# Patient Record
Sex: MALE | Race: WHITE | Employment: FULL TIME | URBAN - METROPOLITAN AREA
[De-identification: names, ages, dates, MRNs, and addresses within clinical notes are randomized per-mention and may not be internally consistent; named-entity substitution may affect disease eponyms.]

---

## 2018-08-11 ENCOUNTER — APPOINTMENT (OUTPATIENT)
Dept: CT IMAGING | Age: 23
End: 2018-08-11

## 2018-08-11 ENCOUNTER — HOSPITAL ENCOUNTER (EMERGENCY)
Age: 23
Discharge: HOME | End: 2018-08-11
Attending: EMERGENCY MEDICINE

## 2018-08-11 VITALS
DIASTOLIC BLOOD PRESSURE: 85 MMHG | HEIGHT: 75 IN | TEMPERATURE: 99.1 F | SYSTOLIC BLOOD PRESSURE: 122 MMHG | BODY MASS INDEX: 18.65 KG/M2 | OXYGEN SATURATION: 100 % | RESPIRATION RATE: 18 BRPM | HEART RATE: 78 BPM | WEIGHT: 150 LBS

## 2018-08-11 DIAGNOSIS — R10.9 RIGHT FLANK PAIN: Primary | ICD-10-CM

## 2018-08-11 LAB
A/G RATIO: 1.5 (ref 1.1–2.2)
ALBUMIN SERPL-MCNC: 4.9 G/DL (ref 3.4–5)
ALP BLD-CCNC: 81 U/L (ref 40–129)
ALT SERPL-CCNC: 13 U/L (ref 10–40)
AMPHETAMINE SCREEN, URINE: NORMAL
ANION GAP SERPL CALCULATED.3IONS-SCNC: 11 MMOL/L (ref 3–16)
AST SERPL-CCNC: 18 U/L (ref 15–37)
BARBITURATE SCREEN URINE: NORMAL
BASOPHILS ABSOLUTE: 0 K/UL (ref 0–0.2)
BASOPHILS RELATIVE PERCENT: 0.4 %
BENZODIAZEPINE SCREEN, URINE: NORMAL
BILIRUB SERPL-MCNC: 0.6 MG/DL (ref 0–1)
BILIRUBIN URINE: NEGATIVE
BLOOD, URINE: NEGATIVE
BUN BLDV-MCNC: 12 MG/DL (ref 7–20)
CALCIUM SERPL-MCNC: 9.9 MG/DL (ref 8.3–10.6)
CANNABINOID SCREEN URINE: NORMAL
CHLORIDE BLD-SCNC: 100 MMOL/L (ref 99–110)
CLARITY: CLEAR
CO2: 27 MMOL/L (ref 21–32)
COCAINE METABOLITE SCREEN URINE: NORMAL
COLOR: YELLOW
CREAT SERPL-MCNC: 0.8 MG/DL (ref 0.9–1.3)
EOSINOPHILS ABSOLUTE: 0.1 K/UL (ref 0–0.6)
EOSINOPHILS RELATIVE PERCENT: 0.9 %
GFR AFRICAN AMERICAN: >60
GFR NON-AFRICAN AMERICAN: >60
GLOBULIN: 3.2 G/DL
GLUCOSE BLD-MCNC: 102 MG/DL (ref 70–99)
GLUCOSE URINE: NEGATIVE MG/DL
HCT VFR BLD CALC: 43.4 % (ref 40.5–52.5)
HEMOGLOBIN: 15.3 G/DL (ref 13.5–17.5)
KETONES, URINE: NEGATIVE MG/DL
LEUKOCYTE ESTERASE, URINE: NEGATIVE
LYMPHOCYTES ABSOLUTE: 2.4 K/UL (ref 1–5.1)
LYMPHOCYTES RELATIVE PERCENT: 41.6 %
Lab: NORMAL
MCH RBC QN AUTO: 32.2 PG (ref 26–34)
MCHC RBC AUTO-ENTMCNC: 35.3 G/DL (ref 31–36)
MCV RBC AUTO: 91.3 FL (ref 80–100)
METHADONE SCREEN, URINE: NORMAL
MICROSCOPIC EXAMINATION: NORMAL
MONOCYTES ABSOLUTE: 0.6 K/UL (ref 0–1.3)
MONOCYTES RELATIVE PERCENT: 10 %
NEUTROPHILS ABSOLUTE: 2.7 K/UL (ref 1.7–7.7)
NEUTROPHILS RELATIVE PERCENT: 47.1 %
NITRITE, URINE: NEGATIVE
OPIATE SCREEN URINE: NORMAL
OXYCODONE URINE: NORMAL
PDW BLD-RTO: 12.5 % (ref 12.4–15.4)
PH UA: 6
PH UA: 6
PHENCYCLIDINE SCREEN URINE: NORMAL
PLATELET # BLD: 220 K/UL (ref 135–450)
PMV BLD AUTO: 9.4 FL (ref 5–10.5)
POTASSIUM SERPL-SCNC: 4.2 MMOL/L (ref 3.5–5.1)
PROPOXYPHENE SCREEN: NORMAL
PROTEIN UA: NEGATIVE MG/DL
RBC # BLD: 4.76 M/UL (ref 4.2–5.9)
SODIUM BLD-SCNC: 138 MMOL/L (ref 136–145)
SPECIFIC GRAVITY UA: >=1.03
TOTAL PROTEIN: 8.1 G/DL (ref 6.4–8.2)
URINE TYPE: NORMAL
UROBILINOGEN, URINE: 0.2 E.U./DL
WBC # BLD: 5.7 K/UL (ref 4–11)

## 2018-08-11 PROCEDURE — 85025 COMPLETE CBC W/AUTO DIFF WBC: CPT

## 2018-08-11 PROCEDURE — 80307 DRUG TEST PRSMV CHEM ANLYZR: CPT

## 2018-08-11 PROCEDURE — 80053 COMPREHEN METABOLIC PANEL: CPT

## 2018-08-11 PROCEDURE — 96374 THER/PROPH/DIAG INJ IV PUSH: CPT

## 2018-08-11 PROCEDURE — 81003 URINALYSIS AUTO W/O SCOPE: CPT

## 2018-08-11 PROCEDURE — 99284 EMERGENCY DEPT VISIT MOD MDM: CPT

## 2018-08-11 PROCEDURE — 6360000004 HC RX CONTRAST MEDICATION: Performed by: EMERGENCY MEDICINE

## 2018-08-11 PROCEDURE — 74177 CT ABD & PELVIS W/CONTRAST: CPT

## 2018-08-11 PROCEDURE — 6360000002 HC RX W HCPCS: Performed by: PHYSICIAN ASSISTANT

## 2018-08-11 RX ORDER — NAPROXEN 500 MG/1
500 TABLET ORAL 2 TIMES DAILY
Qty: 20 TABLET | Refills: 0 | Status: SHIPPED | OUTPATIENT
Start: 2018-08-11 | End: 2018-08-21

## 2018-08-11 RX ORDER — KETOROLAC TROMETHAMINE 30 MG/ML
30 INJECTION, SOLUTION INTRAMUSCULAR; INTRAVENOUS ONCE
Status: DISCONTINUED | OUTPATIENT
Start: 2018-08-11 | End: 2018-08-11 | Stop reason: HOSPADM

## 2018-08-11 RX ORDER — KETOROLAC TROMETHAMINE 30 MG/ML
15 INJECTION, SOLUTION INTRAMUSCULAR; INTRAVENOUS ONCE
Status: COMPLETED | OUTPATIENT
Start: 2018-08-11 | End: 2018-08-11

## 2018-08-11 RX ADMIN — IOPAMIDOL 75 ML: 755 INJECTION, SOLUTION INTRAVENOUS at 18:13

## 2018-08-11 RX ADMIN — KETOROLAC TROMETHAMINE 15 MG: 30 INJECTION, SOLUTION INTRAMUSCULAR at 16:25

## 2018-08-11 ASSESSMENT — ENCOUNTER SYMPTOMS
RESPIRATORY NEGATIVE: 1
GASTROINTESTINAL NEGATIVE: 1

## 2018-08-11 ASSESSMENT — PAIN DESCRIPTION - DESCRIPTORS
DESCRIPTORS: SHOOTING
DESCRIPTORS: CONSTANT

## 2018-08-11 ASSESSMENT — PAIN SCALES - GENERAL
PAINLEVEL_OUTOF10: 9
PAINLEVEL_OUTOF10: 5

## 2018-08-11 ASSESSMENT — PAIN DESCRIPTION - LOCATION
LOCATION: ABDOMEN
LOCATION: ABDOMEN

## 2018-08-11 ASSESSMENT — PAIN DESCRIPTION - PAIN TYPE
TYPE: ACUTE PAIN
TYPE: ACUTE PAIN

## 2018-08-11 ASSESSMENT — PAIN DESCRIPTION - FREQUENCY: FREQUENCY: CONTINUOUS

## 2018-08-11 NOTE — ED NOTES
Saline lock removed intact. IV site looked unremarkable. Pressure dressing applied. Discharge instructions reviewed with Mr. Isa Camacho. He verbalized understanding. Copy of discharge instructions and prescriptions given. Mr. Isa Camacho was discharged to home in good condition per personal vehicle, friend/family driving. He exited the ED without difficulty.         Quincy Salas RN  08/11/18 9022

## 2018-08-11 NOTE — ED PROVIDER NOTES
limits    Narrative:     Performed at:  [facility] [address],  [place], [facility]   Phone [phone]   CBC WITH AUTO DIFFERENTIAL    Narrative:     Performed at:  [facility] [address],  [place], [facility]   Phone [phone]   URINALYSIS    Narrative:     Performed at:  [facility] [address],  [place], [facility]   Phone [phone]   URINE DRUG SCREEN    Narrative:     Performed at:  [facility]) [facility] [address],  [place], [facility]   Phone [phone]       All other labs were within normal range or not returned as of this dictation. EKG: All EKG's are interpreted by the Emergency Department Physician who either signs or Co-signs this chart in the absence of a cardiologist.  Please see their note for interpretation of EKG. RADIOLOGY:   Non-plain film images such as CT, Ultrasound and MRI are read by the radiologist. Plain radiographic images are visualized and preliminarily interpreted by the  ED Provider with the below findings:        Interpretation per the Radiologist below, if available at the time of this note:    CT ABDOMEN PELVIS W IV CONTRAST Additional Contrast? None   Final Result   Unremarkable CT abdomen and pelvis without evidence of acute process. No results found.       PROCEDURES   Unless otherwise noted below, none     Procedures    CRITICAL CARE TIME   N/A    CONSULTS:  None      EMERGENCY DEPARTMENT COURSE and DIFFERENTIAL DIAGNOSIS/MDM:   Vitals:    Vitals:    08/11/18 1520 08/11/18 1840   BP: (!) 114/95 122/85   Pulse: 86 78   Resp: 20 18   Temp: 99.1 °F (37.3 °C)    TempSrc: Oral    SpO2: 100% 100%   Weight: 150 lb (68 kg)    Height: 6' 3\" (1.905 m)        Patient was given the following medications:  Medications   ketorolac (TORADOL) injection 30 mg (30 mg Intravenous Not Given 8/11/18 1746)   ketorolac 52.5 %    MCV 91.3 80.0 - 100.0 fL    MCH 32.2 26.0 - 34.0 pg    MCHC 35.3 31.0 - 36.0 g/dL    RDW 12.5 12.4 - 15.4 %    Platelets 071 502 - 734 K/uL    MPV 9.4 5.0 - 10.5 fL    Neutrophils % 47.1 %    Lymphocytes % 41.6 %    Monocytes % 10.0 %    Eosinophils % 0.9 %    Basophils % 0.4 %    Neutrophils # 2.7 1.7 - 7.7 K/uL    Lymphocytes # 2.4 1.0 - 5.1 K/uL    Monocytes # 0.6 0.0 - 1.3 K/uL    Eosinophils # 0.1 0.0 - 0.6 K/uL    Basophils # 0.0 0.0 - 0.2 K/uL   Urinalysis   Result Value Ref Range    Color, UA Yellow Straw/Yellow    Clarity, UA Clear Clear    Glucose, Ur Negative Negative mg/dL    Bilirubin Urine Negative Negative    Ketones, Urine Negative Negative mg/dL    Specific Gravity, UA >=1.030 1.005 - 1.030    Blood, Urine Negative Negative    pH, UA 6.0 5.0 - 8.0    Protein, UA Negative Negative mg/dL    Urobilinogen, Urine 0.2 <2.0 E.U./dL    Nitrite, Urine Negative Negative    Leukocyte Esterase, Urine Negative Negative    Microscopic Examination Not Indicated     Urine Type Not Specified    Urine Drug Screen   Result Value Ref Range    Amphetamine Screen, Urine Neg Negative <1000ng/mL    Barbiturate Screen, Ur Neg Negative <200 ng/mL    Benzodiazepine Screen, Urine Neg Negative <200 ng/mL    Cannabinoid Scrn, Ur Neg Negative <50 ng/mL    Cocaine Metabolite Screen, Urine Neg Negative <300 ng/mL    Opiate Scrn, Ur Neg Negative <300 ng/mL    PCP Screen, Urine Neg Negative <25 ng/mL    Methadone Screen, Urine Neg Negative <300 ng/mL    Propoxyphene Scrn, Ur Neg Negative <300 ng/mL    pH, UA 6.0     Drug Screen Comment: see below     Oxycodone Urine Neg Negative <100 ng/ml         I estimate there is LOW risk for ACUTE APPENDICITIS, BOWEL OBSTRUCTION, CHOLECYSTITIS, DIVERTICULITIS, INCARCERATED HERNIA, PANCREATITIS, or PERFORATED BOWEL or ULCER, thus I consider the discharge disposition reasonable. Also, there is no evidence or peritonitis, sepsis, or toxicity.  Herlinda Bejarano and I have discussed the diagnosis and risks, and we agree with discharging home to follow-up with their primary doctor. We also discussed returning to the Emergency Department immediately if new or worsening symptoms occur. We have discussed the symptoms which are most concerning (e.g., bloody stool, fever, changing or worsening pain, vomiting) that necessitate immediate return. FINAL Impression    1. Right flank pain        Blood pressure 122/85, pulse 78, temperature 99.1 °F (37.3 °C), temperature source Oral, resp. rate 18, height 6' 3\" (1.905 m), weight 150 lb (68 kg), SpO2 100 %. The patient tolerated their visit well. They were seen and evaluated by the attending physician who agreed with the assessment and plan. The patient and / or the family were informed of the results of any tests, a time was given to answer questions, a plan was proposed and they agreed with plan. FINAL IMPRESSION      1. Right flank pain          DISPOSITION/PLAN   DISPOSITION        PATIENT REFERRED TO:  No follow-up provider specified.     DISCHARGE MEDICATIONS:  Discharge Medication List as of 8/11/2018  6:00 PM      START taking these medications    Details   naproxen (NAPROSYN) 500 MG tablet Take 1 tablet by mouth 2 times daily for 20 doses, Disp-20 tablet, R-0Print             DISCONTINUED MEDICATIONS:  Discharge Medication List as of 8/11/2018  6:00 PM                 (Please note that portions of this note were completed with a voice recognition program.  Efforts were made to edit the dictations but occasionally words are mis-transcribed.)    Effie Valentine PA-C (electronically signed)           Effie Valentine PA-C  08/11/18 5723

## 2018-08-11 NOTE — ED PROVIDER NOTES
I independently examined and evaluated Dain Bain. In brief, patient present for evaluation of right sided abdominal/flank pain. He did not lift anything or have any injury that he is aware of. .    Focused exam revealed patient is very well-appearing and in no acute distress. He has a bleeding without any difficulty. Patient without any reproducible tenderness localized to the right flank area or any central thoracic tenderness. .    ED course: Patient presenting for evaluation of right-sided flank/back discomfort. Possibly musculoskeletal is no definite etiology noted on workup including CT scan showing no acute kidney stones or other findings. Patient will be discharged home. Patient follow-up. Reasons to return to the ER were discussed and all questions answered at time of discharge. All diagnostic, treatment, and disposition decisions were made by myself in conjunction with the advanced practice provider. For all further details of the patient's emergency department visit, please see the advanced practice provider's documentation. Comment: Please note this report has been produced using speech recognition software and may contain errors related to that system including errors in grammar, punctuation, and spelling, as well as words and phrases that may be inappropriate. If there are any questions or concerns please feel free to contact the dictating provider for clarification.        Kamini John MD  08/11/18 [facility]